# Patient Record
Sex: MALE | Race: WHITE | NOT HISPANIC OR LATINO | Employment: FULL TIME | URBAN - METROPOLITAN AREA
[De-identification: names, ages, dates, MRNs, and addresses within clinical notes are randomized per-mention and may not be internally consistent; named-entity substitution may affect disease eponyms.]

---

## 2019-09-19 ENCOUNTER — APPOINTMENT (OUTPATIENT)
Dept: GENERAL RADIOLOGY | Facility: CLINIC | Age: 44
End: 2019-09-19
Attending: EMERGENCY MEDICINE
Payer: COMMERCIAL

## 2019-09-19 ENCOUNTER — HOSPITAL ENCOUNTER (EMERGENCY)
Facility: CLINIC | Age: 44
Discharge: HOME | End: 2019-09-19
Attending: EMERGENCY MEDICINE | Admitting: EMERGENCY MEDICINE
Payer: COMMERCIAL

## 2019-09-19 VITALS
TEMPERATURE: 97.7 F | SYSTOLIC BLOOD PRESSURE: 126 MMHG | OXYGEN SATURATION: 99 % | HEIGHT: 68 IN | WEIGHT: 175 LBS | HEART RATE: 72 BPM | RESPIRATION RATE: 16 BRPM | DIASTOLIC BLOOD PRESSURE: 77 MMHG | BODY MASS INDEX: 26.52 KG/M2

## 2019-09-19 DIAGNOSIS — Z77.098 CHEMICAL EXPOSURE: ICD-10-CM

## 2019-09-19 LAB — INTERPRETATION ECG - MUSE: NORMAL

## 2019-09-19 PROCEDURE — 93005 ELECTROCARDIOGRAM TRACING: CPT

## 2019-09-19 PROCEDURE — 71046 X-RAY EXAM CHEST 2 VIEWS: CPT

## 2019-09-19 PROCEDURE — 99284 EMERGENCY DEPT VISIT MOD MDM: CPT | Mod: 25

## 2019-09-19 PROCEDURE — 25000125 ZZHC RX 250: Performed by: EMERGENCY MEDICINE

## 2019-09-19 PROCEDURE — 94640 AIRWAY INHALATION TREATMENT: CPT

## 2019-09-19 RX ORDER — ALBUTEROL SULFATE 5 MG/ML
5 SOLUTION RESPIRATORY (INHALATION) EVERY 6 HOURS PRN
Status: DISCONTINUED | OUTPATIENT
Start: 2019-09-19 | End: 2019-09-19 | Stop reason: HOSPADM

## 2019-09-19 RX ADMIN — ALBUTEROL SULFATE 5 MG: 2.5 SOLUTION RESPIRATORY (INHALATION) at 17:28

## 2019-09-19 ASSESSMENT — ENCOUNTER SYMPTOMS
VOMITING: 0
FATIGUE: 1
DIARRHEA: 0
SEIZURES: 0
SHORTNESS OF BREATH: 1
CHEST TIGHTNESS: 1

## 2019-09-19 ASSESSMENT — MIFFLIN-ST. JEOR: SCORE: 1663.29

## 2019-09-19 NOTE — DISCHARGE INSTRUCTIONS
As we discussed, no respiratory issues or consequences were found from your exposure last weekend.  However should you develop any new chest pain, or any other concerns you need to come back to the emergency room immediately, as we discussed.  Please see her regular doctor in the coming week.

## 2019-09-19 NOTE — ED AVS SNAPSHOT
Emergency Department  [address] [place] [address]  Phone:  [phone]  Fax:  [phone]                                    Shalom Parra   MRN: [id number]    Department:   Emergency Department   Date of Visit:  9/19/2019           After Visit Summary Signature Page    I have received my discharge instructions, and my questions have been answered. I have discussed any challenges I see with this plan with the nurse or doctor.    ..........................................................................................................................................  Patient/Patient Representative Signature      ..........................................................................................................................................  Patient Representative Print Name and Relationship to Patient    ..................................................               ................................................  Date                                   Time    ..........................................................................................................................................  Reviewed by Signature/Title    ...................................................              ..............................................  Date                                               Time          22EPIC Rev 08/18

## 2019-09-19 NOTE — ED PROVIDER NOTES
"  History     Chief Complaint:  Chemical Exposure    HPI   hSalom Parra is a 44 year old male who presents to the emergency department for evaluation after chemical exposure. The patient reports cleaning mold off his basement floor on 9/14 with bleach and carpet cleaning solution for about 5-6 hours. The patient endorses developing chest tightness one day later. He notes that he felt ill and fatigued on 9/14-9/15 and has not been able to exercise as a result of his symptoms. He notes his symptoms improved on 9/16 and 9/17, but he developed chest tightness, shortness of breath, and fatigue again today. He denies chest pain, vomiting, or diarrhea, diplopia, or seizures. He does not smoke cigarettes.     Allergies:  Avocado  Banana    Medications:    Synthroid  Adderall    Past Medical History:    GERD  ADHD  Hypothyroidism     Past Surgical History:    The patient does not have any pertinent past surgical history.    Family History:    Father: atrial fibrillation, GERD, hypertension, hyperlipidemia  Mother: diabetes type 2, liver disease, hypothyroidism   Sister: hypothyroidism   Son: ADHD    Social History:  Smoking Status: Never Smoker  Smokeless Tobacco: Never Used  Alcohol Use: Positive  Drug Use: Negative    Review of Systems   Constitutional: Positive for fatigue.   Eyes: Negative for visual disturbance.   Respiratory: Positive for chest tightness and shortness of breath.    Cardiovascular: Negative for chest pain.   Gastrointestinal: Negative for diarrhea and vomiting.   Neurological: Negative for seizures.   All other systems reviewed and are negative.      Physical Exam     Patient Vitals for the past 24 hrs:   BP Temp Temp src Pulse Resp SpO2 Height Weight   09/19/19 1838 -- -- -- -- 16 99 % -- --   09/19/19 1644 126/77 97.7  F (36.5  C) Oral 72 20 99 % 1.727 m (5' 8\") 79.4 kg (175 lb)       Physical Exam  Vitals: reviewed by me  General: Pt seen on [facility], pleasant, cooperative, and alert to " conversation  Eyes: Tracking well, clear conjunctiva BL  ENT: MMM, midline trachea.   Lungs: No tachypnea, no accessory muscle use. No respiratory distress.   CV: Rate as above, regular rhythm.    MSK: no peripheral edema or joint effusion.  No evidence of trauma  Skin: No rash, normal turgor and temperature  Neuro: Clear speech and no facial droop.  Psych: Not RIS, no e/o AH/VH      Emergency Department Course   ECG:  Time: 1648  Vent. Rate 82 bpm. NM interval 144. QRS duration 84. QT/QTc 364/425. P-R-T axis 79 86 40.  Normal sinus rhythm  Normal ECG   Read time: 1649    Imaging:  Radiographic findings were communicated with the patient who voiced understanding of the findings.    XR Chest 2 Views  IMPRESSION: No radiographic evidence of acute chest abnormality. As per radiology.    Interventions:  1728 Albuterol 5 mg Nebulization    Emergency Department Course:   Past medical records, nursing notes, and vitals reviewed.  1710: I performed an exam of the patient and obtained history, as documented above.    EKG obtained in the ED, see results above.     The patient was sent for a XR Chest while in the emergency department, results above.     1817 I rechecked and updated the patient.    Findings and plan explained to the Patient. Patient discharged home with instructions regarding supportive care, medications, and reasons to return. The importance of close follow-up was reviewed. The patient was prescribed Proair Respiclick.     I personally reviewed the imaging results with the Patient and answered all related questions prior to discharge.      Impression & Plan      Medical Decision Making:  Shalom Parra is a 44 year old male who presents to the emergency room with what appears to be a chemical exposure 5 or 6 days ago. He has no respiratory issues now, has normal chest XR, and feels improved after albuterol. He did take in what seems like a fair amount of chloride, and was around mold, though it was never  aerosolized. He has no issues here in the ER, no respiratory distress, no evidence of caustic injury to his oropharynx, and appears to be able to do all of his ADL's. EKG done out of abundance of caution, shows no widening or toxic effects. He has no anginal equivalence or exertional chest pain. Will discharge with albuterol as he states that his helped him here. Red flags for him to come back to the ER were discussed.    Critical Care time: none    Diagnosis:    ICD-10-CM    1. Chemical exposure Z77.098        Disposition:  discharged to home    Discharge Medications:  Discharge Medication List as of 9/19/2019  6:25 PM      START taking these medications    Details   albuterol (PROAIR RESPICLICK) 108 (90 Base) MCG/ACT inhaler Inhale 2 puffs into the lungs every 6 hours as needed for shortness of breath / dyspnea or wheezing, Disp-1 each, R-0, Local Print             I, Nellie Barboza, am serving as a scribe on 9/19/2019 at 5:10 PM to personally document services performed by Siva Dillon* based on my observations and the provider's statements to me.       Nellie Barboza  9/19/2019    EMERGENCY DEPARTMENT       Siva Dillon MD  09/19/19 5801

## 2019-09-19 NOTE — ED TRIAGE NOTES
Last Saturday. Pt was cleaning mold off his basement floor with bleach for hours in a small space. Pt states that night and the next day he felt horrible with cough, sob, and weak. Improved this week, but now today c/o chest pressure, sob and feeling weak

## 2019-09-19 NOTE — ED NOTES
Pt states that on Saturday he was using bleach to clean mold in his basement and was fine earlier this week however today has been feeling weak, experiencing chest pain, and SOB causing him to come in for evaluation  Radial pulse regular. Respirations are equal and non-labored, patting satting 99% on RA.

## 2022-12-30 ENCOUNTER — HOSPITAL ENCOUNTER (EMERGENCY)
Dept: CARDIOLOGY | Facility: CLINIC | Age: 47
Discharge: HOME | End: 2022-12-30
Attending: EMERGENCY MEDICINE
Payer: COMMERCIAL

## 2022-12-30 ENCOUNTER — HOSPITAL ENCOUNTER (EMERGENCY)
Facility: CLINIC | Age: 47
Discharge: HOME | End: 2022-12-30
Attending: EMERGENCY MEDICINE | Admitting: EMERGENCY MEDICINE
Payer: COMMERCIAL

## 2022-12-30 VITALS
HEART RATE: 69 BPM | DIASTOLIC BLOOD PRESSURE: 97 MMHG | WEIGHT: 184 LBS | SYSTOLIC BLOOD PRESSURE: 113 MMHG | HEIGHT: 68 IN | OXYGEN SATURATION: 98 % | TEMPERATURE: 97.7 F | RESPIRATION RATE: 13 BRPM | BODY MASS INDEX: 27.89 KG/M2

## 2022-12-30 DIAGNOSIS — I48.0 PAROXYSMAL ATRIAL FIBRILLATION (H): ICD-10-CM

## 2022-12-30 DIAGNOSIS — R07.9 CHEST PAIN, UNSPECIFIED TYPE: ICD-10-CM

## 2022-12-30 LAB
ALBUMIN SERPL-MCNC: 3.8 G/DL (ref 3.4–5)
ALP SERPL-CCNC: 68 U/L (ref 40–150)
ALT SERPL W P-5'-P-CCNC: 44 U/L (ref 0–70)
ANION GAP SERPL CALCULATED.3IONS-SCNC: 5 MMOL/L (ref 3–14)
AST SERPL W P-5'-P-CCNC: 39 U/L (ref 0–45)
ATRIAL RATE - MUSE: NORMAL BPM
BASOPHILS # BLD AUTO: 0.1 10E3/UL (ref 0–0.2)
BASOPHILS NFR BLD AUTO: 1 %
BILIRUB DIRECT SERPL-MCNC: <0.1 MG/DL (ref 0–0.2)
BILIRUB SERPL-MCNC: 0.5 MG/DL (ref 0.2–1.3)
BUN SERPL-MCNC: 17 MG/DL (ref 7–30)
CALCIUM SERPL-MCNC: 9.2 MG/DL (ref 8.5–10.1)
CHLORIDE BLD-SCNC: 104 MMOL/L (ref 94–109)
CO2 SERPL-SCNC: 30 MMOL/L (ref 20–32)
CREAT SERPL-MCNC: 1.03 MG/DL (ref 0.66–1.25)
DIASTOLIC BLOOD PRESSURE - MUSE: NORMAL MMHG
EOSINOPHIL # BLD AUTO: 0.3 10E3/UL (ref 0–0.7)
EOSINOPHIL NFR BLD AUTO: 4 %
ERYTHROCYTE [DISTWIDTH] IN BLOOD BY AUTOMATED COUNT: 12 % (ref 10–15)
GFR SERPL CREATININE-BSD FRML MDRD: >90 ML/MIN/1.73M2
GLUCOSE BLD-MCNC: 102 MG/DL (ref 70–99)
HCT VFR BLD AUTO: 53.5 % (ref 40–53)
HGB BLD-MCNC: 17.5 G/DL (ref 13.3–17.7)
IMM GRANULOCYTES # BLD: 0 10E3/UL
IMM GRANULOCYTES NFR BLD: 0 %
INTERPRETATION ECG - MUSE: NORMAL
LYMPHOCYTES # BLD AUTO: 2.2 10E3/UL (ref 0.8–5.3)
LYMPHOCYTES NFR BLD AUTO: 30 %
MAGNESIUM SERPL-MCNC: 2.4 MG/DL (ref 1.6–2.3)
MCH RBC QN AUTO: 30.6 PG (ref 26.5–33)
MCHC RBC AUTO-ENTMCNC: 32.7 G/DL (ref 31.5–36.5)
MCV RBC AUTO: 94 FL (ref 78–100)
MONOCYTES # BLD AUTO: 0.7 10E3/UL (ref 0–1.3)
MONOCYTES NFR BLD AUTO: 10 %
NEUTROPHILS # BLD AUTO: 4.2 10E3/UL (ref 1.6–8.3)
NEUTROPHILS NFR BLD AUTO: 55 %
NRBC # BLD AUTO: 0 10E3/UL
NRBC BLD AUTO-RTO: 0 /100
P AXIS - MUSE: NORMAL DEGREES
PLATELET # BLD AUTO: 203 10E3/UL (ref 150–450)
POTASSIUM BLD-SCNC: 4.2 MMOL/L (ref 3.4–5.3)
PR INTERVAL - MUSE: NORMAL MS
PROT SERPL-MCNC: 7.3 G/DL (ref 6.8–8.8)
QRS DURATION - MUSE: 86 MS
QT - MUSE: 336 MS
QTC - MUSE: 424 MS
R AXIS - MUSE: 36 DEGREES
RBC # BLD AUTO: 5.72 10E6/UL (ref 4.4–5.9)
SODIUM SERPL-SCNC: 139 MMOL/L (ref 133–144)
SYSTOLIC BLOOD PRESSURE - MUSE: NORMAL MMHG
T AXIS - MUSE: 28 DEGREES
TROPONIN I SERPL HS-MCNC: 5 NG/L
TSH SERPL DL<=0.005 MIU/L-ACNC: 0.47 MU/L (ref 0.4–4)
VENTRICULAR RATE- MUSE: 96 BPM
WBC # BLD AUTO: 7.4 10E3/UL (ref 4–11)

## 2022-12-30 PROCEDURE — 99284 EMERGENCY DEPT VISIT MOD MDM: CPT

## 2022-12-30 PROCEDURE — 83735 ASSAY OF MAGNESIUM: CPT | Performed by: EMERGENCY MEDICINE

## 2022-12-30 PROCEDURE — 93242 EXT ECG>48HR<7D RECORDING: CPT

## 2022-12-30 PROCEDURE — 80053 COMPREHEN METABOLIC PANEL: CPT | Performed by: EMERGENCY MEDICINE

## 2022-12-30 PROCEDURE — 85025 COMPLETE CBC W/AUTO DIFF WBC: CPT | Performed by: EMERGENCY MEDICINE

## 2022-12-30 PROCEDURE — 93005 ELECTROCARDIOGRAM TRACING: CPT | Mod: XU

## 2022-12-30 PROCEDURE — 93244 EXT ECG>48HR<7D REV&INTERPJ: CPT | Performed by: INTERNAL MEDICINE

## 2022-12-30 PROCEDURE — 82310 ASSAY OF CALCIUM: CPT | Performed by: EMERGENCY MEDICINE

## 2022-12-30 PROCEDURE — 36415 COLL VENOUS BLD VENIPUNCTURE: CPT | Performed by: EMERGENCY MEDICINE

## 2022-12-30 PROCEDURE — 84443 ASSAY THYROID STIM HORMONE: CPT | Performed by: EMERGENCY MEDICINE

## 2022-12-30 PROCEDURE — 84484 ASSAY OF TROPONIN QUANT: CPT | Performed by: EMERGENCY MEDICINE

## 2022-12-30 PROCEDURE — 82248 BILIRUBIN DIRECT: CPT | Performed by: EMERGENCY MEDICINE

## 2022-12-30 ASSESSMENT — ACTIVITIES OF DAILY LIVING (ADL): ADLS_ACUITY_SCORE: 35

## 2022-12-30 ASSESSMENT — ENCOUNTER SYMPTOMS
PALPITATIONS: 1
UNEXPECTED WEIGHT CHANGE: 0
CHILLS: 0
FEVER: 0

## 2022-12-30 NOTE — ED TRIAGE NOTES
Pt reports woke up with palpitations. Pt checked rhythm on watch and it showed a-fib hr 115. Pt reports was dizzy. Pt also reports was dizzy two days ago as well     Triage Assessment     Row Name 12/30/22 0818       Triage Assessment (Adult)    Airway WDL WDL       Respiratory WDL    Respiratory WDL WDL       Skin Circulation/Temperature WDL    Skin Circulation/Temperature WDL WDL       Cardiac WDL    Cardiac WDL X;rhythm    Pulse Rate & Regularity radial pulse irregular       Peripheral/Neurovascular WDL    Peripheral Neurovascular WDL WDL       Cognitive/Neuro/Behavioral WDL    Cognitive/Neuro/Behavioral WDL WDL

## 2022-12-30 NOTE — ED PROVIDER NOTES
History   Chief Complaint:  Palpitations       The history is provided by the patient.      Shalom Parra is a 47 year old male with history of hypothyroidism and GERD who presents with palpitations. He reports last night around 0300 he woke up to his heart racing and this persisted through the night. He woke up again at 0645 this morning and used his apple watch to measure his rate/rhythm, which showed atrial fibrillation with a rate around 100, peaking at 130. The patient notes some chest pain during this. He reports that palpitations resolved about one hour ago. He notes family history of atrial fibrillation, but denies any personal history. He denies history of hypertension, diabetes mellitus, smoking, drug use, and notes borderline hyperlipidemia. The patient denies fever, chills, and weight changes. He has been sleeping well and has not had increased stress. He reports drinking alcohol, noting a couple drinks each day which increased in the past week. Last night he had two beers and a glass of red wine. The patient also mentions recent muscle pains and swelling in his hands for about one month. He denies family history of rheumatologic issues.     Review of Systems   Constitutional: Negative for chills, fever and unexpected weight change.   Cardiovascular: Positive for chest pain and palpitations.   All other systems reviewed and are negative.    Allergies:  Avocado  Banana    Medications:  Albuterol   Levothyroxine  Prilosec  Adderall     Past Medical History:     Hypothyroidism  GERD  ADHD      Family History:    Atrial fibrillation  Esophageal cancer  GERD  Hypertension  Hyperlipidemia  Diabetes mellitus  Liver disease  Thyroid disorder     Social History:  The patient presents to the ED with his wife  Reports alcohol use   Denies smoking and drug use  PCP: Esther Brooke     Physical Exam     Patient Vitals for the past 24 hrs:   BP Temp Temp src Pulse Resp SpO2 Height Weight   12/30/22 0829 124/80  "97.7  F (36.5  C) Oral 64 16 97 % 1.727 m (5' 8\") 83.5 kg (184 lb)       Physical Exam  GENERAL: well developed, pleasant  HEAD: atraumatic  EYES: pupils reactive, extraocular muscles intact, conjunctivae normal  ENT:  mucus membranes moist  NECK:  trachea midline, normal range of motion  RESPIRATORY: no tachypnea, breath sounds clear to auscultation   CVS: normal S1/S2, no murmurs, intact distal pulses  ABDOMEN: soft, nontender, nondistention  MUSCULOSKELETAL: no deformities  SKIN: warm and dry, no acute rashes or ulceration  NEURO: GCS 15, cranial nerves intact, alert and oriented x3  PSYCH:  Mood/affect normal    Emergency Department Course   ECG  ECG taken at 0822, ECG read at 1230  Atrial fibrillation with premature ventricular or aberrantly conducted complexes  Abnormal ECG    Rate 96 bpm. MT interval * ms. QRS duration 86 ms. QT/QTc 336/424 ms. P-R-T axes * 36 28.     Laboratory:  Labs Ordered and Resulted from Time of ED Arrival to Time of ED Departure   BASIC METABOLIC PANEL - Abnormal       Result Value    Sodium 139      Potassium 4.2      Chloride 104      Carbon Dioxide (CO2) 30      Anion Gap 5      Urea Nitrogen 17      Creatinine 1.03      Calcium 9.2      Glucose 102 (*)     GFR Estimate >90     CBC WITH PLATELETS AND DIFFERENTIAL - Abnormal    WBC Count 7.4      RBC Count 5.72      Hemoglobin 17.5      Hematocrit 53.5 (*)     MCV 94      MCH 30.6      MCHC 32.7      RDW 12.0      Platelet Count 203      % Neutrophils 55      % Lymphocytes 30      % Monocytes 10      % Eosinophils 4      % Basophils 1      % Immature Granulocytes 0      NRBCs per 100 WBC 0      Absolute Neutrophils 4.2      Absolute Lymphocytes 2.2      Absolute Monocytes 0.7      Absolute Eosinophils 0.3      Absolute Basophils 0.1      Absolute Immature Granulocytes 0.0      Absolute NRBCs 0.0     MAGNESIUM - Abnormal    Magnesium 2.4 (*)    TSH WITH FREE T4 REFLEX - Normal    TSH 0.47     HEPATIC FUNCTION PANEL - Normal    " Bilirubin Total 0.5      Bilirubin Direct <0.1      Protein Total 7.3      Albumin 3.8      Alkaline Phosphatase 68      AST 39      ALT 44     TROPONIN I - Normal    Troponin I High Sensitivity 5          Emergency Department Course:     Reviewed:  I reviewed nursing notes, vitals and past medical history    Assessments:  1221 I obtained history and examined the patient as noted above.   1318 I rechecked the patient and explained findings. At this point I feel that the patient is safe for discharge, and the patient agrees.     Disposition:  The patient was discharged to home.     Impression & Plan     Medical Decision Making:    Patient presents with episode of tachycardia and atrial fibrillation witnessed on his ultra apple iPhone and  can show me on his phone the details.  He did notice some discomfort with this prior greatly rate related.  He has been drinking heavier during the holiday season does drink on a regular basis.  Certainly alcohol may be issue with this.  His electrolytes thyroid work-up is unremarkable.  He converted on his own.  He does exercise without any significant exertional symptoms but has noted some swelling into his arms and legs.  Patient's been monitored and continues to be in a sinus rhythm.  We will get him set up for Holter monitor and stress test and follow-up with his primary care doctor as well as cutting significantly down on his alcohol.    Diagnosis:    ICD-10-CM    1. Paroxysmal atrial fibrillation (H)  I48.0 Leadless EKG Monitor 3 to 7 Days      2. Chest pain, unspecified type  R07.9 Echo Stress Echocardiogram          Scribe Disclosure:  I, Caitlyn Myers, am serving as a scribe at 12:11 PM on 12/30/2022 to document services personally performed by Nabil Zheng MD based on my observations and the provider's statements to me.        Nabil Zheng MD  12/30/22 0851

## 2023-01-04 ENCOUNTER — HOSPITAL ENCOUNTER (OUTPATIENT)
Dept: CARDIOLOGY | Facility: CLINIC | Age: 48
Discharge: HOME | End: 2023-01-04
Attending: EMERGENCY MEDICINE | Admitting: EMERGENCY MEDICINE
Payer: COMMERCIAL

## 2023-01-04 DIAGNOSIS — R07.9 CHEST PAIN, UNSPECIFIED TYPE: ICD-10-CM

## 2023-01-04 PROCEDURE — 93325 DOPPLER ECHO COLOR FLOW MAPG: CPT | Mod: TC

## 2023-01-04 PROCEDURE — 93018 CV STRESS TEST I&R ONLY: CPT | Performed by: INTERNAL MEDICINE

## 2023-01-04 PROCEDURE — 93017 CV STRESS TEST TRACING ONLY: CPT

## 2023-01-04 PROCEDURE — 93016 CV STRESS TEST SUPVJ ONLY: CPT | Performed by: INTERNAL MEDICINE

## 2023-01-04 PROCEDURE — 93325 DOPPLER ECHO COLOR FLOW MAPG: CPT | Mod: 26 | Performed by: INTERNAL MEDICINE

## 2023-01-04 PROCEDURE — 93321 DOPPLER ECHO F-UP/LMTD STD: CPT | Mod: 26 | Performed by: INTERNAL MEDICINE

## 2023-01-04 PROCEDURE — 93350 STRESS TTE ONLY: CPT | Mod: 26 | Performed by: INTERNAL MEDICINE

## 2023-01-15 ENCOUNTER — HEALTH MAINTENANCE LETTER (OUTPATIENT)
Age: 48
End: 2023-01-15

## 2024-02-17 ENCOUNTER — HEALTH MAINTENANCE LETTER (OUTPATIENT)
Age: 49
End: 2024-02-17

## 2024-04-08 ENCOUNTER — ANCILLARY PROCEDURE (OUTPATIENT)
Dept: CT IMAGING | Facility: CLINIC | Age: 49
End: 2024-04-08
Attending: INTERNAL MEDICINE
Payer: COMMERCIAL

## 2024-04-08 DIAGNOSIS — D72.10 EOSINOPHILIA: ICD-10-CM

## 2024-04-08 PROCEDURE — 250N000009 HC RX 250: Performed by: INTERNAL MEDICINE

## 2024-04-08 PROCEDURE — 250N000011 HC RX IP 250 OP 636: Performed by: INTERNAL MEDICINE

## 2024-04-08 PROCEDURE — 71260 CT THORAX DX C+: CPT

## 2024-04-08 RX ORDER — IOPAMIDOL 755 MG/ML
72 INJECTION, SOLUTION INTRAVASCULAR ONCE
Status: COMPLETED | OUTPATIENT
Start: 2024-04-08 | End: 2024-04-08

## 2024-04-08 RX ADMIN — IOPAMIDOL 72 ML: 755 INJECTION, SOLUTION INTRAVENOUS at 06:58

## 2024-04-08 RX ADMIN — SODIUM CHLORIDE 40 ML: 9 INJECTION, SOLUTION INTRAVENOUS at 06:59

## 2025-03-08 ENCOUNTER — HEALTH MAINTENANCE LETTER (OUTPATIENT)
Age: 50
End: 2025-03-08